# Patient Record
Sex: FEMALE | Race: BLACK OR AFRICAN AMERICAN | NOT HISPANIC OR LATINO | Employment: FULL TIME | URBAN - METROPOLITAN AREA
[De-identification: names, ages, dates, MRNs, and addresses within clinical notes are randomized per-mention and may not be internally consistent; named-entity substitution may affect disease eponyms.]

---

## 2017-01-10 PROBLEM — N83.209 OTHER AND UNSPECIFIED OVARIAN CYST: Status: ACTIVE | Noted: 2017-01-10

## 2017-01-31 ENCOUNTER — HOSPITAL ENCOUNTER (EMERGENCY)
Facility: HOSPITAL | Age: 22
Discharge: HOME | End: 2017-01-31
Attending: EMERGENCY MEDICINE
Payer: MEDICAID

## 2017-01-31 VITALS
OXYGEN SATURATION: 100 % | HEIGHT: 65 IN | BODY MASS INDEX: 38.99 KG/M2 | SYSTOLIC BLOOD PRESSURE: 134 MMHG | HEART RATE: 77 BPM | WEIGHT: 234 LBS | DIASTOLIC BLOOD PRESSURE: 79 MMHG | TEMPERATURE: 99 F | RESPIRATION RATE: 18 BRPM
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
## 2017-01-31 DIAGNOSIS — S63.501A WRIST SPRAIN, RIGHT, INITIAL ENCOUNTER: ICD-10-CM

## 2017-01-31 PROCEDURE — 99284 EMERGENCY DEPT VISIT MOD MDM: CPT | Mod: 25

## 2017-01-31 PROCEDURE — 29125 APPL SHORT ARM SPLINT STATIC: CPT | Mod: RT

## 2017-01-31 PROCEDURE — 25000003 PHARM REV CODE 250: Performed by: NURSE PRACTITIONER

## 2017-01-31 RX ORDER — ACETAMINOPHEN 500 MG
1000 TABLET ORAL
Status: COMPLETED | OUTPATIENT
Start: 2017-01-31 | End: 2017-01-31

## 2017-01-31 RX ADMIN — ACETAMINOPHEN 1000 MG: 500 TABLET ORAL at 03:01

## 2017-01-31 NOTE — ED PROVIDER NOTES
Encounter Date: 2017    SCRIBE #1 NOTE: I, Kristy Kwan, am scribing for, and in the presence of,  Beth Novoa NP. I have scribed the following portions of the note - Other sections scribed: ROS and HPI.       History     Chief Complaint   Patient presents with    Fall     arrived via [facility], called to home for c/o slip and fall while mopping, c/o lower back pain, R hand pain, neg LOC, pregnant, EDC 17, denies vag bleeding     Review of patient's allergies indicates:  No Known Allergies  HPI Comments: CC: Fall    HPI: This 21 y.o. female who is G4:P3:M0:A0 and 10 weeks pregnant with a past medical history of Depression; History of fetal anomaly in prior pregnancy; and Hypertension, presents to the ED via EMS c/o a mechanical slip and fall while mopping just PTA. She states she fell backward and extended her R hand. She is c/o R hand pain and R lower back pain. She denies head injury, LOC, dizziness, numbness/weakness, N/V or any other symptoms. She wants to make sure her baby is fine. She denies abdominal pain, vaginal discharge, and vaginal bleeding. She was seen by Dr. Perez (OB/GYN) this morning prior to her fall and had an US done, which was normal. She is unsure about her LNMP. No prior tx.    The history is provided by the patient. No  was used.     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  Past Surgical History   Procedure Laterality Date    Cholecystectomy       section       Family History   Problem Relation Age of Onset    Breast cancer Neg Hx     Colon cancer Neg Hx     Ovarian cancer Neg Hx      Social History   Substance Use Topics    Smoking  status: Never Smoker    Smokeless tobacco: None    Alcohol use No     Review of Systems   Constitutional: Negative for fever.   Gastrointestinal: Negative for abdominal pain, constipation, diarrhea, nausea and vomiting.   Genitourinary: Negative for vaginal bleeding and vaginal discharge.   Musculoskeletal: Positive for back pain (R, lower) and myalgias (R hand).   Skin: Negative for rash.   Neurological: Negative for dizziness, syncope, weakness, light-headedness, numbness and headaches.       Physical Exam   Initial Vitals   BP Pulse Resp Temp SpO2   01/31/17 1334 01/31/17 1334 01/31/17 1334 01/31/17 1334 01/31/17 1334   153/72 92 20 98.3 °F (36.8 °C) 98 %     Physical Exam    Nursing note and vitals reviewed.  Constitutional: Vital signs are normal. She appears well-developed and well-nourished. She is not diaphoretic. She is active and cooperative. She does not appear ill. No distress.   Eyes: EOM are normal. Pupils are equal, round, and reactive to light.   Abdominal: Soft. Normal appearance. There is no tenderness. There is no rigidity, no rebound, no guarding and no CVA tenderness.   Musculoskeletal:        Right hand: She exhibits tenderness. She exhibits normal capillary refill, no deformity and no swelling. Lacerations: over the 4th and 5th digits, ulnar hand, and ulnar wrist. Normal sensation noted. Normal strength noted.        Hands:  Neurological: She is alert and oriented to person, place, and time.         ED Course   US - ED Performed - OB Limited 1 or More Gestations  Date/Time: 1/31/2017 3:15 PM  Performed by: EMILY GOLDSMITH  Authorized by: SHA MCQUEEN   Comments: Single IUP with fetal heart tones appreciated.        Labs Reviewed - No data to display             Additional MDM:   Comments: This is an urgent evaluation of a 21-year-old female that presents the emergency room after fall today.  Patient is complaining of right hand pain, and on exam she has mild to moderate tenderness over the fourth  and fifth digits, dorsal hand, and ulnar wrist.  There is no gross deformity.  Her compartments are soft.  Sensory motor function and cap refill are intact.  X-rays of the right hand reveal no evidence of acute fracture or dislocation.  Most likely a sprain or strain.  Velcro wrist splint was placed for supportive care.  To follow-up with orthopedics referral in 1 week if no significant improvement.  Cool compresses, tylenol for pain PRN, and rest was encouraged.    The patient is approximately 10 weeks pregnant and was also concerned for her pregnancy related to the fall.  She was seen by her OB/GYN, Dr. Perez, this morning and had a normal ultrasound demonstrating an IUP.  She reports that she did not fall directly on her abdomen, but is still concerned.  She has no abdominal pain or tenderness.  A bedside ultrasound was performed for reassurance, which revealed a single IUP with strong fetal heart tones.  To follow-up with OB/GYN for any further concerns.    Case discussed with attending, , and she is in agreement with plan. Stable for discharge and outpatient follow.  .          Scribe Attestation:   Scribe #1: I performed the above scribed service and the documentation accurately describes the services I performed. I attest to the accuracy of the note.    Attending Attestation:           Physician Attestation for Scribe:  Physician Attestation Statement for Scribe #1: I, Beth Novoa NP, reviewed documentation, as scribed by Kristy Kwan in my presence, and it is both accurate and complete.                 ED Course     Clinical Impression:   The primary encounter diagnosis was Strain of hand and finger, right, initial encounter. Diagnoses of Wrist sprain, right, initial encounter and Fall, initial encounter were also pertinent to this visit.    Disposition:   Disposition: Discharged  Condition: Stable       Beth Novoa NP  02/01/17 0229

## 2017-01-31 NOTE — ED TRIAGE NOTES
About 12 noon fell on a wet floor while mopping today co low abd pain low back pain and right hand pain - denies head or neck or upper or mid back pains

## 2017-01-31 NOTE — ED AVS SNAPSHOT
[facility]  [address] [place] [address]               Lindy Singleton   2017  2:48 PM   ED    Description:  Female : 1995   Department:  [facility]           Your Care was Coordinated By:     Provider Role From To    Vanna Farris MD Attending Provider [id number] --    Beth Novoa NP Nurse Practitioner 17 [phone] --      Reason for Visit     Fall           Diagnoses this Visit        Comments    Strain of hand and finger, right, initial encounter    -  Primary     Wrist sprain, right, initial encounter         Fall, initial encounter           ED Disposition     ED Disposition Condition Comment    Discharge             To Do List           Follow-up Information     Follow up with [facility].    Specialty:  Emergency Medicine    Why:  If symptoms worsen    Contact information:    [address] [place] [id number] [phone]        Follow up with Melissa Barry III, MD.    Specialty:  Orthopedic Surgery    Why:  ORTHOPEDICS/HAND - CALL FOR APPOINTMENT IF HAND/WRIST PAIN DOES NOT RESOLVE    Contact information:    [address]  [facility]  [place] [address]  [phone]        [facility]     [facility] Care [facility] -  Assistance  Registered nurses in the [facility] provide clinical advisement, health education, appointment booking, and other advisory services.  Call for this free service at [phone].             Medications           Message regarding Medications     Verify the changes and/or additions to your medication regime listed below are the same as discussed with your clinician today.  If any of these changes or additions are incorrect, please notify your healthcare provider.        These medications were administered today        Dose Freq    acetaminophen tablet 1,000 mg 1,000 mg ED 1 Time    Sig: Take 2 tablets (1,000 mg total) by mouth ED 1 Time.  "   Class: Normal    Route: Oral      STOP taking these medications     [id number]-dha-epa-fish oil (PRENATAL GUMMY) 400 mcg-35 mg -25 mg-5 mg Chew Take by mouth.    terconazole (TERAZOL 3) 0.8 % vaginal cream Place 1 applicator vaginally every evening.    promethazine (PHENERGAN) 25 MG tablet Take 1 tablet (25 mg total) by mouth every 6 (six) hours as needed for Nausea.           Verify that the below list of medications is an accurate representation of the medications you are currently taking.  If none reported, the list may be blank. If incorrect, please contact your healthcare provider. Carry this list with you in case of emergency.           Current Medications            Clinical Reference Information           Your Vitals Were     BP Pulse Temp Resp Height Weight    153/72 (BP Location: Left arm, Patient Position: Sitting) 92 98.3 °F (36.8 °C) (Oral) 20 5' 5" (1.651 m) 106.1 kg (234 lb)    Last Period SpO2 BMI          11/20/2016 (Exact Date) 98% 38.94 kg/m2        Allergies as of 1/31/2017     No Known Allergies      Immunizations Administered on Date of Encounter - 1/31/2017     None      ED Micro, Lab, POCT     None      ED Imaging Orders     Start Ordered       Status Ordering Provider    01/31/17 1503 01/31/17 1503  X-Ray Hand 3 view Right  1 time imaging     Comments:  Please use shield if possible    Final result         Discharge Instructions       You can wear the Velcro wrist splint to help immobilize the joints that are injured and allow them to rest and heal. You should use cool compresses or ice over the affected areas to reduce swelling.  You can take Tylenol for pain, as needed.  Please do not exceed over 4000 mg of acetaminophen per day.    Follow up with orthopedics for further evaluation of your hand/wrist pain, if it does not resolve in 4-6 weeks.    Return to the ER for any new or worsening symptoms or concerns.    Discharge References/Attachments     WRIST SPRAIN (ENGLISH)    FINGER " ADRIANA (ENGLISH)      Your Scheduled Appointments     Feb 21, 2017  9:00 AM CST   Repeat OB Visit-[facility] with Kristan Perez MD   [facility] ([facility])    [address] [place] [address]  [place] [address]   [phone]              [facility] Sign-Up     Activating your [facility] account is as easy as 1-2-3!     1) Visit [email or web address], select Sign Up Now, enter this activation code and your date of birth, then select Next.  [email or web address]  Expires: 3/17/2017  3:57 PM      2) Create a username and password to use when you visit [facility] in the future and select a security question in case you lose your password and select Next.    3) Enter your e-mail address and click Sign Up!    Additional Information  If you have questions, please e-mail [email or web address] or call [phone] to talk to our [facility] staff. Remember, [facility] is NOT to be used for urgent needs. For medical emergencies, dial 911.          [facility] complies with applicable Federal civil rights laws and does not discriminate on the basis of race, color, national origin, age, disability, or sex.        Language Assistance Services     ATTENTION: Language assistance services are available, free of charge. Please call [phone].      ATENCIÓN: Si habla español, tiene a lion disposición servicios gratuitos de asistencia lingüística. Llame al [phone].     CHÚ Ý: N?u b?n nói Ti?ng Vi?t, có các d?ch v? h? tr? ngôn ng? mi?n phí dành cho b?n. G?i s? [phone].

## 2017-01-31 NOTE — DISCHARGE INSTRUCTIONS
You can wear the Velcro wrist splint to help immobilize the joints that are injured and allow them to rest and heal. You should use cool compresses or ice over the affected areas to reduce swelling.  You can take Tylenol for pain, as needed.  Please do not exceed over 4000 mg of acetaminophen per day.    Follow up with orthopedics for further evaluation of your hand/wrist pain, if it does not resolve in 4-6 weeks.    Return to the ER for any new or worsening symptoms or concerns.

## 2017-05-17 PROBLEM — O44.40 LOW-LYING PLACENTA: Status: ACTIVE | Noted: 2017-05-17

## 2017-07-24 ENCOUNTER — OFFICE VISIT (OUTPATIENT)
Dept: MATERNAL FETAL MEDICINE | Facility: CLINIC | Age: 22
End: 2017-07-24
Payer: MEDICAID

## 2017-07-24 VITALS
SYSTOLIC BLOOD PRESSURE: 136 MMHG | HEIGHT: 65 IN | DIASTOLIC BLOOD PRESSURE: 84 MMHG | BODY MASS INDEX: 43.75 KG/M2 | WEIGHT: 262.56 LBS

## 2017-07-24 DIAGNOSIS — O36.5930 INTRAUTERINE GROWTH RESTRICTION AFFECTING ANTEPARTUM CARE OF MOTHER IN THIRD TRIMESTER, NOT APPLICABLE OR UNSPECIFIED FETUS: Primary | ICD-10-CM

## 2017-07-24 PROCEDURE — 76811 OB US DETAILED SNGL FETUS: CPT | Mod: 26,S$PBB,, | Performed by: OBSTETRICS & GYNECOLOGY

## 2017-07-24 PROCEDURE — 76811 OB US DETAILED SNGL FETUS: CPT | Mod: PBBFAC | Performed by: OBSTETRICS & GYNECOLOGY

## 2017-07-24 PROCEDURE — 99213 OFFICE O/P EST LOW 20 MIN: CPT | Mod: PBBFAC | Performed by: OBSTETRICS & GYNECOLOGY

## 2017-07-24 PROCEDURE — 99202 OFFICE O/P NEW SF 15 MIN: CPT | Mod: S$PBB,TH,25, | Performed by: OBSTETRICS & GYNECOLOGY

## 2017-07-24 PROCEDURE — 99999 PR PBB SHADOW E&M-EST. PATIENT-LVL III: CPT | Mod: PBBFAC,,, | Performed by: OBSTETRICS & GYNECOLOGY

## 2017-07-24 NOTE — LETTER
July 25, 2017      Luis Urrutia MD  [address]  [address]  [place] [address]           [facility]  [address] [facility] [address]  Phone: [phone]          Patient: Lindy Singleton   MR Number: [id number]   YOB: 1995   Date of Visit: 7/24/2017       Dear Dr. Luis Urrutia:    Thank you for referring Lindy Singleton to me for evaluation. Attached you will find relevant portions of my assessment and plan of care.    If you have questions, please do not hesitate to call me. I look forward to following Lindy Singleton along with you.    Sincerely,    Srinivasan Chow MD    Enclosure  CC:  No Recipients    If you would like to receive this communication electronically, please contact [email or web address] or [phone] to request more information on [employer] Link access.    For providers and/or their staff who would like to refer a patient to [facility], please contact us through our one-stop-shop provider referral line, [facility], at [phone].    If you feel you have received this communication in error or would no longer like to receive these types of communications, please e-mail [email or web address]

## 2017-07-25 NOTE — PROGRESS NOTES
[facility] Consult    Requesting Physician: Dr. Urrutia    Reason for Consultation: IUGR    Lindy is a 21-year-old para 3003 currently at 35 weeks and 1 day gestation based on early ultrasound evaluation who is seen in consultation because of a recent ultrasound which demonstrated IUGR.  Overall this pregnancy has been uncomplicated.  She has a history of chronic hypertension which was diagnosed in  and she was on labetalol until 2016.  She has not been on any medications during this pregnancy.  She has no history of end organ damage from her hypertension.  She also has a history of depression but she is not currently on medications.  She has a BMI of 43 and an ultrasound was recently performed to assess fetal growth.  That ultrasound demonstrated IUGR and therefore a consultation was requested.  I do not have the records of that ultrasound so am unaware with estimated fetal weight was.  She reports good fetal movement.  She has not had any bleeding.  She has not had any signs of labor. Her blood pressure in our office initially was 140/90 but on repeat it was 136/84.  She denies any significant signs or symptoms of preeclampsia.  She reports that she has had occasional headaches but they go away spontaneously without any treatment.  Her urine dip shows trace protein and trace glucose.  She had her 1 hour glucose challenge test which was abnormal at 161 but she has subsequently had a 3 hour GTT which was normal.    Her past medical history is remarkable for depression and hypertension.  Her past surgical history is notable for prior  delivery in  as well as a cholecystectomy.  Her social history is negative ×3.  Her family history is notable for the fact that her child born in  had a large omphalocele.  Her first child weighed 7 lbs. 10 oz. and was male at 40 weeks.  Her second child weighed 5 pounds and was born at 37 weeks.  Her third child who had the omphalocele was born at 37 weeks and  weighed 6 lbs. 12 oz.  She had aneuploidy screening in this pregnancy which returned low risk and her maternal serum AFP was also normal.    A targeted ultrasound evaluation was performed and the findings are detailed on the viewpoint report.  Overall views were somewhat limited by the gestational age and the fetal position but we saw no evidence of any gross structural abnormalities.  When evaluating the intracranial anatomy we saw a cavum vergae variant.  The estimated fetal weight was 2436 g which is at the 21st percentile.  The amniotic fluid volume appeared normal.    I overall spent approximately 20 minutes in face-to-face time with her greater than 50% of which were spent in counseling time regarding today's findings and in discussion of further care coordination.  I explained that at this point we do not see any evidence of significant fetal growth restriction, but given her history of chronic hypertension as well as the potential for gestational hypertension I would recommend delivering her at around 39 weeks and she says a repeat  is scheduled for .  In addition I also would recommend continuing with  testing with either weekly biophysical profiles or twice weekly nonstress test and once a week amniotic fluid assessment.  We discussed how ultrasound weight estimate can vary, but given the fetal weight estimate today and the size of her prior infants, I do not think that this likely represents pathologic growth restriction.  I do not think that she needs further Doppler studies at this point.  If she should develop any signs of worsening gestational hypertension or preeclampsia, I would then recommend delivery prior to 39 weeks.    Thank you for allowing me to participate in her care.  If I can can answer any further questions or be of any assistance, please do not hesitate to call.    Srinivasan Chow Jr., MD  Maternal Fetal Medicine

## 2017-07-26 ENCOUNTER — HOSPITAL ENCOUNTER (EMERGENCY)
Facility: OTHER | Age: 22
Discharge: HOME | End: 2017-07-26
Attending: EMERGENCY MEDICINE
Payer: MEDICAID

## 2017-07-26 VITALS
BODY MASS INDEX: 42.59 KG/M2 | HEIGHT: 66 IN | OXYGEN SATURATION: 98 % | DIASTOLIC BLOOD PRESSURE: 75 MMHG | WEIGHT: 265 LBS | RESPIRATION RATE: 18 BRPM | HEART RATE: 90 BPM | TEMPERATURE: 99 F | SYSTOLIC BLOOD PRESSURE: 135 MMHG

## 2017-07-26 DIAGNOSIS — S90.212A CONTUSION OF LEFT GREAT TOE WITH DAMAGE TO NAIL, INITIAL ENCOUNTER: Primary | ICD-10-CM

## 2017-07-26 DIAGNOSIS — T14.90XA INJURY: ICD-10-CM

## 2017-07-26 PROCEDURE — 99283 EMERGENCY DEPT VISIT LOW MDM: CPT

## 2017-07-26 RX ORDER — IBUPROFEN 800 MG/1
800 TABLET ORAL EVERY 6 HOURS PRN
Qty: 30 TABLET | Refills: 0 | Status: SHIPPED | OUTPATIENT
Start: 2017-07-26 | End: 2017-08-01
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
## 2017-07-31 PROBLEM — O99.820 GBS (GROUP B STREPTOCOCCUS CARRIER), +RV CULTURE, CURRENTLY PREGNANT: Status: ACTIVE | Noted: 2017-07-31

## 2017-08-08 PROBLEM — O44.40 LOW-LYING PLACENTA: Status: RESOLVED | Noted: 2017-05-17 | Resolved: 2017-08-08

## 2017-08-17 PROBLEM — O99.019 ANEMIA DURING PREGNANCY: Status: ACTIVE | Noted: 2017-08-17

## 2017-08-23 ENCOUNTER — SURGERY (OUTPATIENT)
Age: 22
End: 2017-08-23

## 2017-08-23 ENCOUNTER — HOSPITAL ENCOUNTER (INPATIENT)
Facility: HOSPITAL | Age: 22
LOS: 2 days | Discharge: HOME | End: 2017-08-25
Attending: OBSTETRICS & GYNECOLOGY | Admitting: OBSTETRICS & GYNECOLOGY
Payer: MEDICAID

## 2017-08-23 ENCOUNTER — ANESTHESIA (OUTPATIENT)
Dept: OBSTETRICS AND GYNECOLOGY | Facility: HOSPITAL | Age: 22
End: 2017-08-23
Payer: MEDICAID

## 2017-08-23 ENCOUNTER — ANESTHESIA EVENT (OUTPATIENT)
Dept: OBSTETRICS AND GYNECOLOGY | Facility: HOSPITAL | Age: 22
End: 2017-08-23
Payer: MEDICAID

## 2017-08-23 DIAGNOSIS — Z34.03 ENCOUNTER FOR SUPERVISION OF NORMAL FIRST PREGNANCY IN THIRD TRIMESTER: ICD-10-CM

## 2017-08-23 LAB
ABO + RH BLD: NORMAL
BASOPHILS # BLD AUTO: 0.02 K/UL
BASOPHILS NFR BLD: 0.2 %
BLD GP AB SCN CELLS X3 SERPL QL: NORMAL
DIFFERENTIAL METHOD: ABNORMAL
EOSINOPHIL # BLD AUTO: 0.2 K/UL
EOSINOPHIL NFR BLD: 2.2 %
ERYTHROCYTE [DISTWIDTH] IN BLOOD BY AUTOMATED COUNT: 13.8 %
HCT VFR BLD AUTO: 33.4 %
HGB BLD-MCNC: 11.4 G/DL
LYMPHOCYTES # BLD AUTO: 2.9 K/UL
LYMPHOCYTES NFR BLD: 29.9 %
MCH RBC QN AUTO: 27.9 PG
MCHC RBC AUTO-ENTMCNC: 34.1 G/DL
MCV RBC AUTO: 82 FL
MONOCYTES # BLD AUTO: 0.7 K/UL
MONOCYTES NFR BLD: 7.3 %
NEUTROPHILS # BLD AUTO: 5.9 K/UL
NEUTROPHILS NFR BLD: 60.4 %
PLATELET # BLD AUTO: 236 K/UL
PMV BLD AUTO: 11 FL
RBC # BLD AUTO: 4.09 M/UL
WBC # BLD AUTO: 9.71 K/UL

## 2017-08-23 PROCEDURE — 37000008 HC ANESTHESIA 1ST 15 MINUTES: Performed by: OBSTETRICS & GYNECOLOGY

## 2017-08-23 PROCEDURE — 36415 COLL VENOUS BLD VENIPUNCTURE: CPT

## 2017-08-23 PROCEDURE — 51702 INSERT TEMP BLADDER CATH: CPT

## 2017-08-23 PROCEDURE — 63600175 PHARM REV CODE 636 W HCPCS: Performed by: NURSE ANESTHETIST, CERTIFIED REGISTERED

## 2017-08-23 PROCEDURE — 63600175 PHARM REV CODE 636 W HCPCS: Performed by: OBSTETRICS & GYNECOLOGY

## 2017-08-23 PROCEDURE — 27200688 HC TRAY, SPINAL-HYPER/ ISOBARIC: Performed by: ANESTHESIOLOGY

## 2017-08-23 PROCEDURE — S0028 INJECTION, FAMOTIDINE, 20 MG: HCPCS | Performed by: ANESTHESIOLOGY

## 2017-08-23 PROCEDURE — 63600175 PHARM REV CODE 636 W HCPCS: Performed by: ANESTHESIOLOGY

## 2017-08-23 PROCEDURE — 59514 CESAREAN DELIVERY ONLY: CPT | Mod: ANES,,, | Performed by: ANESTHESIOLOGY

## 2017-08-23 PROCEDURE — 25000003 PHARM REV CODE 250: Performed by: NURSE ANESTHETIST, CERTIFIED REGISTERED

## 2017-08-23 PROCEDURE — 62322 NJX INTERLAMINAR LMBR/SAC: CPT | Performed by: ANESTHESIOLOGY

## 2017-08-23 PROCEDURE — 25000003 PHARM REV CODE 250: Performed by: ANESTHESIOLOGY

## 2017-08-23 PROCEDURE — 36000685 HC CESAREAN SECTION LEVEL I

## 2017-08-23 PROCEDURE — 59514 CESAREAN DELIVERY ONLY: CPT | Mod: CRNA,,, | Performed by: NURSE ANESTHETIST, CERTIFIED REGISTERED

## 2017-08-23 PROCEDURE — 85025 COMPLETE CBC W/AUTO DIFF WBC: CPT

## 2017-08-23 PROCEDURE — 11000001 HC ACUTE MED/SURG PRIVATE ROOM

## 2017-08-23 PROCEDURE — 25000003 PHARM REV CODE 250: Performed by: OBSTETRICS & GYNECOLOGY

## 2017-08-23 PROCEDURE — 86900 BLOOD TYPING SEROLOGIC ABO: CPT

## 2017-08-23 PROCEDURE — 37000009 HC ANESTHESIA EA ADD 15 MINS: Performed by: OBSTETRICS & GYNECOLOGY

## 2017-08-23 PROCEDURE — 86850 RBC ANTIBODY SCREEN: CPT

## 2017-08-23 PROCEDURE — 27200918 HC ALEXIS O RING

## 2017-08-23 RX ORDER — SODIUM CITRATE AND CITRIC ACID MONOHYDRATE 334; 500 MG/5ML; MG/5ML
30 SOLUTION ORAL ONCE
Status: DISCONTINUED | OUTPATIENT
Start: 2017-08-23 | End: 2017-08-23 | Stop reason: SDUPTHER

## 2017-08-23 RX ORDER — FENTANYL CITRATE 50 UG/ML
INJECTION, SOLUTION INTRAMUSCULAR; INTRAVENOUS
Status: DISCONTINUED | OUTPATIENT
Start: 2017-08-23 | End: 2017-08-23

## 2017-08-23 RX ORDER — BISACODYL 10 MG
10 SUPPOSITORY, RECTAL RECTAL ONCE AS NEEDED
Status: ACTIVE | OUTPATIENT
Start: 2017-08-23 | End: 2017-08-23

## 2017-08-23 RX ORDER — HYDROCORTISONE 25 MG/G
CREAM TOPICAL 3 TIMES DAILY PRN
Status: DISCONTINUED | OUTPATIENT
Start: 2017-08-23 | End: 2017-08-25 | Stop reason: HOSPADM

## 2017-08-23 RX ORDER — ACETAMINOPHEN 10 MG/ML
1000 INJECTION, SOLUTION INTRAVENOUS ONCE
Status: COMPLETED | OUTPATIENT
Start: 2017-08-23 | End: 2017-08-23

## 2017-08-23 RX ORDER — NALOXONE HCL 0.4 MG/ML
0.02 VIAL (ML) INJECTION
Status: DISCONTINUED | OUTPATIENT
Start: 2017-08-23 | End: 2017-08-25 | Stop reason: HOSPADM

## 2017-08-23 RX ORDER — PHENYLEPHRINE HYDROCHLORIDE 10 MG/ML
INJECTION INTRAVENOUS
Status: DISCONTINUED | OUTPATIENT
Start: 2017-08-23 | End: 2017-08-23

## 2017-08-23 RX ORDER — ADHESIVE BANDAGE
30 BANDAGE TOPICAL 2 TIMES DAILY PRN
Status: DISCONTINUED | OUTPATIENT
Start: 2017-08-24 | End: 2017-08-25 | Stop reason: HOSPADM

## 2017-08-23 RX ORDER — KETOROLAC TROMETHAMINE 30 MG/ML
30 INJECTION, SOLUTION INTRAMUSCULAR; INTRAVENOUS EVERY 6 HOURS
Status: COMPLETED | OUTPATIENT
Start: 2017-08-23 | End: 2017-08-24

## 2017-08-23 RX ORDER — OXYCODONE AND ACETAMINOPHEN 5; 325 MG/1; MG/1
1 TABLET ORAL EVERY 4 HOURS PRN
Status: DISCONTINUED | OUTPATIENT
Start: 2017-08-23 | End: 2017-08-25 | Stop reason: HOSPADM

## 2017-08-23 RX ORDER — LABETALOL 100 MG/1
100 TABLET, FILM COATED ORAL EVERY 12 HOURS
Status: DISCONTINUED | OUTPATIENT
Start: 2017-08-23 | End: 2017-08-24

## 2017-08-23 RX ORDER — OXYTOCIN 10 [USP'U]/ML
INJECTION, SOLUTION INTRAMUSCULAR; INTRAVENOUS
Status: DISCONTINUED | OUTPATIENT
Start: 2017-08-23 | End: 2017-08-23

## 2017-08-23 RX ORDER — ONDANSETRON HYDROCHLORIDE 2 MG/ML
INJECTION, SOLUTION INTRAMUSCULAR; INTRAVENOUS
Status: DISCONTINUED | OUTPATIENT
Start: 2017-08-23 | End: 2017-08-23

## 2017-08-23 RX ORDER — HYDROMORPHONE HCL IN 0.9% NACL 6 MG/30 ML
PATIENT CONTROLLED ANALGESIA SYRINGE INTRAVENOUS CONTINUOUS
Status: DISCONTINUED | OUTPATIENT
Start: 2017-08-23 | End: 2017-08-25 | Stop reason: HOSPADM

## 2017-08-23 RX ORDER — ONDANSETRON 8 MG/1
8 TABLET, ORALLY DISINTEGRATING ORAL EVERY 8 HOURS PRN
Status: DISCONTINUED | OUTPATIENT
Start: 2017-08-23 | End: 2017-08-23

## 2017-08-23 RX ORDER — OXYTOCIN/RINGER'S LACTATE 20/1000 ML
41.65 PLASTIC BAG, INJECTION (ML) INTRAVENOUS CONTINUOUS
Status: ACTIVE | OUTPATIENT
Start: 2017-08-23 | End: 2017-08-23

## 2017-08-23 RX ORDER — OXYCODONE AND ACETAMINOPHEN 10; 325 MG/1; MG/1
1 TABLET ORAL EVERY 4 HOURS PRN
Status: DISCONTINUED | OUTPATIENT
Start: 2017-08-23 | End: 2017-08-25 | Stop reason: HOSPADM

## 2017-08-23 RX ORDER — DOCUSATE SODIUM 100 MG/1
200 CAPSULE, LIQUID FILLED ORAL 2 TIMES DAILY
Status: DISCONTINUED | OUTPATIENT
Start: 2017-08-23 | End: 2017-08-25 | Stop reason: HOSPADM

## 2017-08-23 RX ORDER — IBUPROFEN 600 MG/1
600 TABLET ORAL EVERY 6 HOURS PRN
Status: DISCONTINUED | OUTPATIENT
Start: 2017-08-23 | End: 2017-08-25 | Stop reason: HOSPADM

## 2017-08-23 RX ORDER — METOCLOPRAMIDE HYDROCHLORIDE 5 MG/ML
10 INJECTION INTRAMUSCULAR; INTRAVENOUS ONCE
Status: DISCONTINUED | OUTPATIENT
Start: 2017-08-23 | End: 2017-08-23 | Stop reason: SDUPTHER

## 2017-08-23 RX ORDER — FAMOTIDINE 10 MG/ML
20 INJECTION INTRAVENOUS ONCE
Status: DISCONTINUED | OUTPATIENT
Start: 2017-08-23 | End: 2017-08-23 | Stop reason: SDUPTHER

## 2017-08-23 RX ORDER — SODIUM CITRATE AND CITRIC ACID MONOHYDRATE 334; 500 MG/5ML; MG/5ML
30 SOLUTION ORAL ONCE
Status: COMPLETED | OUTPATIENT
Start: 2017-08-23 | End: 2017-08-23

## 2017-08-23 RX ORDER — ONDANSETRON 8 MG/1
8 TABLET, ORALLY DISINTEGRATING ORAL EVERY 8 HOURS PRN
Status: DISCONTINUED | OUTPATIENT
Start: 2017-08-23 | End: 2017-08-25 | Stop reason: HOSPADM

## 2017-08-23 RX ORDER — SODIUM CHLORIDE, SODIUM LACTATE, POTASSIUM CHLORIDE, CALCIUM CHLORIDE 600; 310; 30; 20 MG/100ML; MG/100ML; MG/100ML; MG/100ML
INJECTION, SOLUTION INTRAVENOUS CONTINUOUS PRN
Status: DISCONTINUED | OUTPATIENT
Start: 2017-08-23 | End: 2017-08-23

## 2017-08-23 RX ORDER — MISOPROSTOL 200 UG/1
600 TABLET ORAL
Status: DISCONTINUED | OUTPATIENT
Start: 2017-08-23 | End: 2017-08-25 | Stop reason: HOSPADM

## 2017-08-23 RX ORDER — SODIUM CHLORIDE, SODIUM LACTATE, POTASSIUM CHLORIDE, CALCIUM CHLORIDE 600; 310; 30; 20 MG/100ML; MG/100ML; MG/100ML; MG/100ML
INJECTION, SOLUTION INTRAVENOUS CONTINUOUS
Status: ACTIVE | OUTPATIENT
Start: 2017-08-23 | End: 2017-08-24

## 2017-08-23 RX ORDER — BUPIVACAINE HYDROCHLORIDE 7.5 MG/ML
INJECTION, SOLUTION INTRASPINAL
Status: DISCONTINUED | OUTPATIENT
Start: 2017-08-23 | End: 2017-08-23

## 2017-08-23 RX ORDER — AMOXICILLIN 250 MG
1 CAPSULE ORAL NIGHTLY PRN
Status: DISCONTINUED | OUTPATIENT
Start: 2017-08-23 | End: 2017-08-25 | Stop reason: HOSPADM

## 2017-08-23 RX ORDER — METOCLOPRAMIDE HYDROCHLORIDE 5 MG/ML
10 INJECTION INTRAMUSCULAR; INTRAVENOUS ONCE
Status: COMPLETED | OUTPATIENT
Start: 2017-08-23 | End: 2017-08-23

## 2017-08-23 RX ORDER — CEFAZOLIN SODIUM 2 G/50ML
2 SOLUTION INTRAVENOUS ONCE
Status: COMPLETED | OUTPATIENT
Start: 2017-08-23 | End: 2017-08-23

## 2017-08-23 RX ORDER — SIMETHICONE 80 MG
1 TABLET,CHEWABLE ORAL EVERY 6 HOURS PRN
Status: DISCONTINUED | OUTPATIENT
Start: 2017-08-23 | End: 2017-08-25 | Stop reason: HOSPADM

## 2017-08-23 RX ORDER — DIPHENHYDRAMINE HCL 25 MG
25 CAPSULE ORAL EVERY 4 HOURS PRN
Status: DISCONTINUED | OUTPATIENT
Start: 2017-08-23 | End: 2017-08-25 | Stop reason: HOSPADM

## 2017-08-23 RX ORDER — FAMOTIDINE 10 MG/ML
20 INJECTION INTRAVENOUS ONCE
Status: COMPLETED | OUTPATIENT
Start: 2017-08-23 | End: 2017-08-23

## 2017-08-23 RX ADMIN — FENTANYL CITRATE 15 MCG: 50 INJECTION INTRAMUSCULAR; INTRAVENOUS at 10:08

## 2017-08-23 RX ADMIN — PHENYLEPHRINE HYDROCHLORIDE 100 MCG: 10 INJECTION INTRAVENOUS at 10:08

## 2017-08-23 RX ADMIN — BUPIVACAINE HYDROCHLORIDE IN DEXTROSE 1.6 ML: 7.5 INJECTION, SOLUTION SUBARACHNOID at 10:08

## 2017-08-23 RX ADMIN — SODIUM CHLORIDE, SODIUM LACTATE, POTASSIUM CHLORIDE, AND CALCIUM CHLORIDE: .6; .31; .03; .02 INJECTION, SOLUTION INTRAVENOUS at 10:08

## 2017-08-23 RX ADMIN — SODIUM CITRATE AND CITRIC ACID MONOHYDRATE 30 ML: 500; 334 SOLUTION ORAL at 09:08

## 2017-08-23 RX ADMIN — METOCLOPRAMIDE 10 MG: 5 INJECTION, SOLUTION INTRAMUSCULAR; INTRAVENOUS at 09:08

## 2017-08-23 RX ADMIN — CEFAZOLIN SODIUM 2 G: 2 SOLUTION INTRAVENOUS at 09:08

## 2017-08-23 RX ADMIN — FENTANYL CITRATE 35 MCG: 50 INJECTION INTRAMUSCULAR; INTRAVENOUS at 10:08

## 2017-08-23 RX ADMIN — FENTANYL CITRATE 50 MCG: 50 INJECTION INTRAMUSCULAR; INTRAVENOUS at 10:08

## 2017-08-23 RX ADMIN — FAMOTIDINE 20 MG: 10 INJECTION, SOLUTION INTRAVENOUS at 09:08

## 2017-08-23 RX ADMIN — ACETAMINOPHEN 1000 MG: 10 INJECTION, SOLUTION INTRAVENOUS at 01:08

## 2017-08-23 RX ADMIN — Medication: at 11:08

## 2017-08-23 RX ADMIN — OXYTOCIN 30 UNITS: 10 INJECTION, SOLUTION INTRAMUSCULAR; INTRAVENOUS at 10:08

## 2017-08-23 RX ADMIN — LABETALOL HYDROCHLORIDE 100 MG: 100 TABLET, FILM COATED ORAL at 09:08

## 2017-08-23 RX ADMIN — ONDANSETRON 8 MG: 8 TABLET, ORALLY DISINTEGRATING ORAL at 07:08

## 2017-08-23 RX ADMIN — OXYTOCIN 20 UNITS: 10 INJECTION, SOLUTION INTRAMUSCULAR; INTRAVENOUS at 10:08

## 2017-08-23 RX ADMIN — Medication: at 03:08

## 2017-08-23 RX ADMIN — SODIUM CHLORIDE, SODIUM LACTATE, POTASSIUM CHLORIDE, AND CALCIUM CHLORIDE: .6; .31; .03; .02 INJECTION, SOLUTION INTRAVENOUS at 07:08

## 2017-08-23 RX ADMIN — SODIUM CHLORIDE, SODIUM LACTATE, POTASSIUM CHLORIDE, AND CALCIUM CHLORIDE: .6; .31; .03; .02 INJECTION, SOLUTION INTRAVENOUS at 09:08

## 2017-08-23 RX ADMIN — LABETALOL HYDROCHLORIDE 100 MG: 100 TABLET, FILM COATED ORAL at 02:08

## 2017-08-23 RX ADMIN — SODIUM CHLORIDE, SODIUM LACTATE, POTASSIUM CHLORIDE, AND CALCIUM CHLORIDE 1000 ML: .6; .31; .03; .02 INJECTION, SOLUTION INTRAVENOUS at 08:08

## 2017-08-23 RX ADMIN — ONDANSETRON 4 MG: 2 INJECTION, SOLUTION INTRAMUSCULAR; INTRAVENOUS at 10:08

## 2017-08-23 RX ADMIN — KETOROLAC TROMETHAMINE 30 MG: 30 INJECTION, SOLUTION INTRAMUSCULAR at 08:08

## 2017-08-23 NOTE — INTERVAL H&P NOTE
The patient has been examined and the H&P has been reviewed:    I concur with the findings and no changes have occurred since H&P was written.    Anesthesia/Surgery risks, benefits and alternative options discussed and understood by patient/family.          Active Hospital Problems    Diagnosis  POA    Encounter for supervision of normal first pregnancy in third trimester [Z34.03]  Not Applicable      Resolved Hospital Problems    Diagnosis Date Resolved POA   No resolved problems to display.

## 2017-08-23 NOTE — OP NOTE
2017      Pre-op: Term pregnancy    History of previous         Post-op:   Term pregnancy    History of previous           Procedure:  Repeat Low Transverse  Section with 2 layer closure    Indications: 21 y.o.  with IUP at 39w3d with previous .     Findings:  female infant, vertex presentation, APGARS 9 at 1 minute, 9 at 5 minutes, weight is pending, normal uterus, tubes and ovaries      EBL: 600 cc    Specimen:  Placenta sent No    Complications:  None    Patient was taken to the operating room after informed consent.  Epidural anesthesia was found to be adequate.  She was prepped and draped in the normal sterile fashion in the dorsal supine position.  Nash catheter had been placed.  A Pfannenstiel skin incision was made  and carried down to the underlying layer of fascia with the Bovie.  The fascia was incised in the midline and extended laterally with the curved Galvan scissors.  The inferior aspect of the fascial incision was grasped with the Ochsner clamps, and the rectus muscle was dissected off using the Bovie Cautery.  The superior aspect of the fascial incision was grasped with the Ochsner clamps, and the rectus muscle was dissected off using the curved Galvan scissors.  The rectus muscles were  in the midline.  The peritoneum was grasped, elevated, and entered sharply with the Metzenbaum scissors.  The incision was extended superiorly and inferiorly with good visualization of the bladder.  The Belia self-retaining retractor was placed within the peritoneal cavity and deployed. The vesicouterine peritoneum was grasped with the pick-ups, elevated, and entered sharply with the Metzenbaum scissors.  The incision was extended laterally, and the bladder flap was created digitally.  The bladder blade was reinserted.  A low transverse incision was made with the scalpel and extended laterally with finger fracture technique.  Membranes were ruptured.  Clear fluid  was present.  The vertex was delivered atraumatically.  The mouth and nose were suctioned with the bulb.  The remaining infant delivered without difficulty.  The cord was cut and clamped.  The infant was handed to the awaiting  nurse practitioner.  The placenta was delivered.  The uterus was cleared of all clots and debris.  The uterus was repaired in a running, locked fashion with #1 chromic.  A second layer using #1 chromic was used to imbricate the incision. The paracolic gutters were cleared of clots and debris.  The uterine incision was irrigated and found to be hemostasis.  The rectus muscles were plicated with chromic suture.  The fascia was closed with 1 looped PDS in a running fashion.  The subcutaneous tissue was re-approximated with 2-0 plain gut.  The skin was closed with Insorb stables.      The patient tolerated the procedure without well.  All counts were correct.  Patient will be taken to the recovery room stable condition.    Augustus Albert MD

## 2017-08-23 NOTE — ANESTHESIA PREPROCEDURE EVALUATION
08/23/2017  Lindy Singleton is a 21 y.o., female.    Anesthesia Evaluation     I have reviewed the Nursing Notes.      Review of Systems  Anesthesia Hx:  No problems with previous Anesthesia   Social:  Non-Smoker    Cardiovascular:   Exercise tolerance: poor Denies Pacemaker. Hypertension (pt had HTN before pregnancy)  Denies Valvular problems/Murmurs.  Denies MI.  Denies CAD.    Denies CABG/stent.  Denies Dysrhythmias.   Denies Angina.             denies PVD    Pulmonary:  Pulmonary Normal    Renal/:  Renal/ Normal     Hepatic/GI:   No Bowel Prep. Denies PUD. Denies Liver Disease. Denies Hepatitis.    Neurological:  Neurology Normal    Endocrine:  Endocrine Normal    Psych:   Psychiatric History          Physical Exam  General:  Morbid Obesity    Airway/Jaw/Neck:  AIRWAY FINDINGS: Normal           Mental Status:  Mental Status Findings: Normal        Anesthesia Plan  Type of Anesthesia, risks & benefits discussed:  Anesthesia Type:  spinal  Patient's Preference:   Intra-op Monitoring Plan: standard ASA monitors  Intra-op Monitoring Plan Comments:   Post Op Pain Control Plan:   Post Op Pain Control Plan Comments:   Induction:    Beta Blocker:  Patient is not currently on a Beta-Blocker (No further documentation required).       Informed Consent: Patient understands risks and agrees with Anesthesia plan.  Questions answered. Anesthesia consent signed with patient.  ASA Score: 2     Day of Surgery Review of History & Physical:    H&P update referred to the provider.         Ready For Surgery From Anesthesia Perspective.

## 2017-08-23 NOTE — LACTATION NOTE
"This note was copied from a baby's chart.     08/23/17 1110   Maternal Infant Assessment   Breast Density Bilateral:;soft   Areola Bilateral:;elastic   Nipple(s) Bilateral:;everted   Infant Assessment   Sucking Reflex present   Rooting Reflex present   Swallow Reflex present   LATCH Score   Latch 2-->grasps breast, tongue down, lips flanged, rhythmic sucking   Audible Swallowing 2-->spontaneous and intermittent (24 hrs old)   Type Of Nipple 2-->everted (after stimulation)   Comfort (Breast/Nipple) 2-->soft/nontender   Hold (Positioning) 2-->no assist from staff, mother able to position/hold infant   Score (less than 7 for 2/more consecutive times, consult Lactation Consultant) 10   Maternal Infant Feeding   Maternal Emotional State independent;relaxed   Infant Positioning cradle   Signs of Milk Transfer infant jaw motion present;audible swallow   Time Spent (min) 0-15 min   Latch Assistance no   Breastfeeding History   Breastfeeding History yes   Duration of Previous Breastfeeding pumped x 6 months for 1 yr old   Infant First Feeding   Breastfeeding Right Side (min) 10 Min   Feeding Infant   Feeding Tolerance/Success alert for feeding   Effective Latch During Feeding yes   Audible Swallow yes   Suck/Swallow Coordination present   Lactation Referrals   Lactation Consult Initial assessment;Knowledge deficit   Lactation Interventions   Maternal Breastfeeding Support encouragement offered;lactation counseling provided     Independently latched well to right breast in cradle hold; audible swallows noted.   Basic breastfeeding instructions given and Mother's Breastfeeding Guide reviewed.  Encouraged to call for assist prn.  States "understand" and verbalized appropriate recall.    "

## 2017-08-23 NOTE — NURSING
Notified Dr. Albert of pt's Bp's. MD gave order for Labetalol 100 mg po bid to be started today, first dose now. Pt may be transferred to pp once bp's are stable.

## 2017-08-23 NOTE — TRANSFER OF CARE
"Anesthesia Transfer of Care Note    Patient: Lindy Singleton    Procedure(s) Performed: Procedure(s) (LRB):  DELIVERY- SECTION (N/A)    Patient location: Labor and Delivery    Anesthesia Type: spinal    Transport from OR: Transported from OR on room air with adequate spontaneous ventilation    Post pain: adequate analgesia    Post assessment: no apparent anesthetic complications    Post vital signs: stable    Level of consciousness: awake, alert and oriented    Nausea/Vomiting: no nausea/vomiting    Complications: none    Transfer of care protocol was followed      Last vitals:   Visit Vitals  /66   Pulse 73   Temp 36.5 °C (97.7 °F) (Oral)   Resp 16   Ht 5' 5" (1.651 m)   Wt 121.6 kg (268 lb)   LMP 2016 (Exact Date)   SpO2 100%   Breastfeeding? No   BMI 44.60 kg/m²     "

## 2017-08-23 NOTE — ANESTHESIA POSTPROCEDURE EVALUATION
"Anesthesia Post Evaluation    Patient: Lindy Singleton    Procedure(s) Performed: Procedure(s) (LRB):  DELIVERY- SECTION (N/A)    Final Anesthesia Type: spinal  Patient location during evaluation: PACU  Patient participation: Yes- Able to Participate  Level of consciousness: awake and alert, oriented and awake  Post-procedure vital signs: reviewed and stable  Pain management: adequate  Airway patency: patent  PONV status at discharge: No PONV  Anesthetic complications: no      Cardiovascular status: blood pressure returned to baseline  Respiratory status: unassisted and spontaneous ventilation  Hydration status: euvolemic  Follow-up not needed.        Visit Vitals  /66   Pulse 73   Temp 36.5 °C (97.7 °F) (Oral)   Resp 16   Ht 5' 5" (1.651 m)   Wt 121.6 kg (268 lb)   LMP 2016 (Exact Date)   SpO2 100%   Breastfeeding? No   BMI 44.60 kg/m²       Pain/Carlo Score: No Data Recorded      "

## 2017-08-23 NOTE — LACTATION NOTE
This note was copied from a baby's chart.     08/23/17 1604   Maternal Information   Date of Referral 08/23/17   Person Making Referral nurse   Maternal Reason for Referral breastfeeding currently   Maternal Infant Assessment   Breast Size Issue none   Breast Shape pendulous;round   Breast Density soft   Areola elastic   Nipple(s) everted   Infant Assessment   Tongue/Frenulum Symptoms appearance normal   Frenulum normal   Gum Symptoms pink   Sucking Reflex present   Rooting Reflex present   Swallow Reflex present   Skin Color pink   LATCH Score   Latch 2-->grasps breast, tongue down, lips flanged, rhythmic sucking   Audible Swallowing 2-->spontaneous and intermittent (24 hrs old)   Type Of Nipple 2-->everted (after stimulation)   Comfort (Breast/Nipple) 2-->soft/nontender   Hold (Positioning) 2-->no assist from staff, mother able to position/hold infant   Score (less than 7 for 2/more consecutive times, consult Lactation Consultant) 10       Number Scale   Presence of Pain denies   Maternal Infant Feeding   Maternal Emotional State independent;relaxed   Signs of Milk Transfer audible swallow   Presence of Pain yes   Comfort Measures Before/During Feeding maternal position adjusted   Time Spent (min) 0-15 min   Breastfeeding Education adequate infant intake;importance of skin-to-skin contact   Breastfeeding History   Currently Breastfeeding yes   Breastfeeding History yes   Previous Exclusive Breastfeeding yes   Previous Breastfeeding Success successful   Infant First Feeding   Infant First Feeding breastfeeding   Breastfeeding Start Date 08/23/17   Skin-to-Skin Contact Maintained   Breastfeeding Left Side (min) 18 Min   Feeding Infant   Feeding Readiness Cues eager;finger sucking;hand to mouth movements   Feeding Tolerance/Success arousal required   Effective Latch During Feeding yes   Audible Swallow yes   Suck/Swallow Coordination present   Lactation Referrals   Lactation Consult Breastfeeding assessment;Initial  assessment   Lactation Interventions   Maternal Breastfeeding Support encouragement offered;infant-mother separation minimized;lactation counseling provided   mother and infant transferred from L and D, minimal assist given to place infant to breast. Mother states that infant breast fed in L and D about 1140 and that she had breast fed other children.

## 2017-08-23 NOTE — PLAN OF CARE
Problem: Patient Care Overview  Goal: Plan of Care Review  Outcome: Ongoing (interventions implemented as appropriate)  POC reviewed with pt, pt verb understanding  [facility]  Breastfeeding

## 2017-08-23 NOTE — TREATMENT PLAN
Pt presents to unit for scheduled c section. POC discussed with pt, questions encouraged and answered. EFM placed x2, +fhts noted. Pt reports feeling occ ctx, denies vag bleeding or LOF. 18g IV started to L hand without difficulty, LR infusing. Call light given, instructed to call out for needs.

## 2017-08-23 NOTE — NURSING
Called anesthesia to notify of pt's increased pt with PCA. Dr. Cobb gave t/o for tylenol IV x 1 dose now.

## 2017-08-23 NOTE — NURSING
Report received from BRITTNEE Causey RN; care assumed. AAOx3, assessment completed. Left hand IV patent, infusing LR @ 500 cc/h without difficulty, no redness or swelling present. Denies pain, rates pain 0/10 using pain scale. POC reviewed, pt verb understanding. Call bell within reach, will monitor.

## 2017-08-23 NOTE — ANESTHESIA PROCEDURE NOTES
Spinal    Diagnosis: IUP  Patient location during procedure: OB  Start time: 8/23/2017 10:05 AM  Timeout: 8/23/2017 10:04 AM  End time: 8/23/2017 10:09 AM  Staffing  Anesthesiologist: PETAR FREDERICK  Performed: anesthesiologist   Preanesthetic Checklist  Completed: patient identified, surgical consent, pre-op evaluation, timeout performed, IV checked, risks and benefits discussed and monitors and equipment checked  Spinal Block  Patient position: sitting  Prep: ChloraPrep  Patient monitoring: heart rate, cardiac monitor and continuous pulse ox  Approach: midline  Location: L3-4  Injection technique: single shot  CSF Fluid: clear free-flowing CSF  Needle  Needle type: pencil-tip   Needle gauge: 25 G  Needle length: 3.5 in  Additional Documentation: incremental injection  Needle localization: anatomical landmarks  Assessment  Sensory level: T4   Dermatomal levels determined by pinch or prick  Ease of block: easy  Patient's tolerance of the procedure: comfortable throughout block and no complaints  Medications:  Bolus administered: 1.6 mL of 0.75 and with dextrose bupivacaine  Opioid administered: 15 mcg of   morphine and fentanyl

## 2017-08-24 LAB
BASOPHILS # BLD AUTO: 0.01 K/UL
BASOPHILS NFR BLD: 0.1 %
DIFFERENTIAL METHOD: ABNORMAL
EOSINOPHIL # BLD AUTO: 0.2 K/UL
EOSINOPHIL NFR BLD: 1.5 %
ERYTHROCYTE [DISTWIDTH] IN BLOOD BY AUTOMATED COUNT: 13.8 %
HCT VFR BLD AUTO: 27.2 %
HGB BLD-MCNC: 9.1 G/DL
LYMPHOCYTES # BLD AUTO: 2.1 K/UL
LYMPHOCYTES NFR BLD: 21.1 %
MCH RBC QN AUTO: 27.7 PG
MCHC RBC AUTO-ENTMCNC: 33.5 G/DL
MCV RBC AUTO: 83 FL
MONOCYTES # BLD AUTO: 0.7 K/UL
MONOCYTES NFR BLD: 7.3 %
NEUTROPHILS # BLD AUTO: 7 K/UL
NEUTROPHILS NFR BLD: 70 %
PLATELET # BLD AUTO: 201 K/UL
PMV BLD AUTO: 10.8 FL
RBC # BLD AUTO: 3.29 M/UL
WBC # BLD AUTO: 10.04 K/UL

## 2017-08-24 PROCEDURE — 25000003 PHARM REV CODE 250: Performed by: OBSTETRICS & GYNECOLOGY

## 2017-08-24 PROCEDURE — 90471 IMMUNIZATION ADMIN: CPT | Performed by: OBSTETRICS & GYNECOLOGY

## 2017-08-24 PROCEDURE — 11000001 HC ACUTE MED/SURG PRIVATE ROOM

## 2017-08-24 PROCEDURE — 90715 TDAP VACCINE 7 YRS/> IM: CPT | Performed by: OBSTETRICS & GYNECOLOGY

## 2017-08-24 PROCEDURE — 85025 COMPLETE CBC W/AUTO DIFF WBC: CPT

## 2017-08-24 PROCEDURE — 36415 COLL VENOUS BLD VENIPUNCTURE: CPT

## 2017-08-24 PROCEDURE — 63600175 PHARM REV CODE 636 W HCPCS: Performed by: OBSTETRICS & GYNECOLOGY

## 2017-08-24 RX ORDER — LABETALOL 100 MG/1
200 TABLET, FILM COATED ORAL EVERY 12 HOURS
Status: COMPLETED | OUTPATIENT
Start: 2017-08-24 | End: 2017-08-25

## 2017-08-24 RX ADMIN — LABETALOL HYDROCHLORIDE 100 MG: 100 TABLET, FILM COATED ORAL at 09:08

## 2017-08-24 RX ADMIN — CLOSTRIDIUM TETANI TOXOID ANTIGEN (FORMALDEHYDE INACTIVATED), CORYNEBACTERIUM DIPHTHERIAE TOXOID ANTIGEN (FORMALDEHYDE INACTIVATED), BORDETELLA PERTUSSIS TOXOID ANTIGEN (GLUTARALDEHYDE INACTIVATED), BORDETELLA PERTUSSIS FILAMENTOUS HEMAGGLUTININ ANTIGEN (FORMALDEHYDE INACTIVATED), BORDETELLA PERTUSSIS PERTACTIN ANTIGEN, AND BORDETELLA PERTUSSIS FIMBRIAE 2/3 ANTIGEN 0.5 ML: 5; 2; 2.5; 5; 3; 5 INJECTION, SUSPENSION INTRAMUSCULAR at 05:08

## 2017-08-24 RX ADMIN — SODIUM CHLORIDE, SODIUM LACTATE, POTASSIUM CHLORIDE, AND CALCIUM CHLORIDE: .6; .31; .03; .02 INJECTION, SOLUTION INTRAVENOUS at 01:08

## 2017-08-24 RX ADMIN — OXYCODONE HYDROCHLORIDE AND ACETAMINOPHEN 1 TABLET: 10; 325 TABLET ORAL at 08:08

## 2017-08-24 RX ADMIN — DOCUSATE SODIUM 200 MG: 100 CAPSULE, LIQUID FILLED ORAL at 08:08

## 2017-08-24 RX ADMIN — DOCUSATE SODIUM 200 MG: 100 CAPSULE, LIQUID FILLED ORAL at 09:08

## 2017-08-24 RX ADMIN — KETOROLAC TROMETHAMINE 30 MG: 30 INJECTION, SOLUTION INTRAMUSCULAR at 01:08

## 2017-08-24 RX ADMIN — KETOROLAC TROMETHAMINE 30 MG: 30 INJECTION, SOLUTION INTRAMUSCULAR at 11:08

## 2017-08-24 RX ADMIN — KETOROLAC TROMETHAMINE 30 MG: 30 INJECTION, SOLUTION INTRAMUSCULAR at 07:08

## 2017-08-24 RX ADMIN — OXYCODONE HYDROCHLORIDE AND ACETAMINOPHEN 1 TABLET: 10; 325 TABLET ORAL at 03:08

## 2017-08-24 RX ADMIN — DIPHENHYDRAMINE HYDROCHLORIDE 25 MG: 25 CAPSULE ORAL at 11:08

## 2017-08-24 RX ADMIN — OXYCODONE HYDROCHLORIDE AND ACETAMINOPHEN 1 TABLET: 10; 325 TABLET ORAL at 09:08

## 2017-08-24 NOTE — PLAN OF CARE
Problem: Patient Care Overview  Goal: Plan of Care Review  Outcome: Ongoing (interventions implemented as appropriate)  VSS. Fundas and lochia WNL. Low transverse abdominal incision edges approximated with steri strips intact; no redness, drainage, or swelling noted.  Ambulating. Voiding. Tolerating diet.  Passing flatus.  Pain meds PRN.

## 2017-08-24 NOTE — PROGRESS NOTES
Lindy Singleton is a 21 y.o. female patient.    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

## 2017-08-24 NOTE — LACTATION NOTE
This note was copied from a baby's chart.     08/24/17 0900   Maternal Infant Assessment   Breast Shape Bilateral:;pendulous   Breast Density Bilateral:;soft   Areola Bilateral:;elastic   Nipple(s) Bilateral:;everted   Infant Assessment   Sucking Reflex present   Rooting Reflex present   Swallow Reflex present   LATCH Score   Latch 2-->grasps breast, tongue down, lips flanged, rhythmic sucking   Audible Swallowing 2-->spontaneous and intermittent (24 hrs old)   Type Of Nipple 2-->everted (after stimulation)   Comfort (Breast/Nipple) 2-->soft/nontender   Hold (Positioning) 2-->no assist from staff, mother able to position/hold infant   Score (less than 7 for 2/more consecutive times, consult Lactation Consultant) 10   Maternal Infant Feeding   Maternal Emotional State independent;relaxed   Infant Positioning cradle   Signs of Milk Transfer audible swallow;infant jaw motion present   Presence of Pain yes   Time Spent (min) 0-15 min   Latch Assistance yes   Breastfeeding Education adequate infant intake;adequate milk volume;importance of skin-to-skin contact   Breastfeeding History   Currently Breastfeeding yes   Infant First Feeding   Breastfeeding breastfeeding, right side only   Breastfeeding Right Side (min) 3 Min  (continues to nurse)   Feeding Infant   Feeding Tolerance/Success arousal required   Effective Latch During Feeding yes   Audible Swallow yes   Suck/Swallow Coordination present   Lactation Referrals   Lactation Consult Breastfeeding assessment;Follow up;Knowledge deficit   Lactation Interventions   Attachment Promotion breastfeeding assistance provided;counseling provided;environment adjusted;face-to-face positioning promoted;infant-mother separation minimized;privacy provided;role responsibility promoted;rooming-in promoted;skin-to-skin contact encouraged   Breastfeeding Assistance assisted with positioning;feeding cue recognition promoted;feeding on demand promoted;infant latch-on verified;infant  suck/swallow verified;support offered   Maternal Breastfeeding Support diary/feeding log utilized;encouragement offered;infant-mother separation minimized;lactation counseling provided   Latch Promotion positioning assisted;infant moved to breast   Baby had not fed for 4 hours; Assisted mother with waking infant to feed; Infant latched easily to right breast with little assistance; Reinforced breastfeeding basics; Encouraged to feed on cue, at least 8 or more times in 24 hours; Mother denies pain or discomfort; Phone number provided; Encouraged to call for needs or assistance prn; Verbalized understanding

## 2017-08-24 NOTE — PHYSICIAN QUERY
PT Name: Lindy Singleton  MR #: [id number]     Physician Query Form - Documentation Clarification      CDS/: Natalie Castle RN-BSN        Contact information:[phone]    This form is a permanent document in the medical record.     Query Date: 2017    By submitting this query, we are merely seeking further clarification of documentation. Please utilize your independent clinical judgment when addressing the question(s) below.    The Medical record reflects the following:    Supporting Clinical Findings Location in Medical Record   Past Medical History:  Hypertension - prepregnancy Labetalol 100 mg BID - stopped at initial visit    Procedure:  Repeat Low Transverse  Section with 2 layer closure    Bp= 174/95, 177/87, 164/95, 170/91    labetalol tablet 100 mg    H&P         Op note       Flowsheet     MAR                                                                                       Doctor, Please specify diagnosis or diagnoses associated with above clinical findings.    Provider Use Only      [x] Pre-existing essential hypertension complicating childbirth  [  ] Past medical history only of hypertension  [  ] Other ( Please specify )____________________________________________                                                                                                                 [  ] Clinically undetermined

## 2017-08-24 NOTE — PLAN OF CARE
Problem: Patient Care Overview  Goal: Plan of Care Review  Outcome: Ongoing (interventions implemented as appropriate)  Reviewed plan of care with patient, patient verbalizes understanding

## 2017-08-25 VITALS
HEART RATE: 92 BPM | WEIGHT: 264 LBS | DIASTOLIC BLOOD PRESSURE: 67 MMHG | TEMPERATURE: 98 F | HEIGHT: 64 IN | OXYGEN SATURATION: 100 % | RESPIRATION RATE: 20 BRPM | BODY MASS INDEX: 45.07 KG/M2 | SYSTOLIC BLOOD PRESSURE: 142 MMHG

## 2017-08-25 PROCEDURE — 25000003 PHARM REV CODE 250: Performed by: OBSTETRICS & GYNECOLOGY

## 2017-08-25 RX ORDER — FERROUS SULFATE 325(65) MG
325 TABLET, DELAYED RELEASE (ENTERIC COATED) ORAL 2 TIMES DAILY
Qty: 60 TABLET | Refills: 3 | Status: SHIPPED | OUTPATIENT
Start: 2017-08-25 | End: 2018-01-11

## 2017-08-25 RX ORDER — IBUPROFEN 600 MG/1
600 TABLET ORAL EVERY 6 HOURS PRN
Qty: 40 TABLET | Refills: 1 | Status: SHIPPED | OUTPATIENT
Start: 2017-08-25 | End: 2018-01-11

## 2017-08-25 RX ORDER — OXYCODONE AND ACETAMINOPHEN 5; 325 MG/1; MG/1
1 TABLET ORAL EVERY 4 HOURS PRN
Qty: 28 TABLET | Refills: 0 | Status: SHIPPED | OUTPATIENT
Start: 2017-08-25 | End: 2018-01-11

## 2017-08-25 RX ADMIN — IBUPROFEN 600 MG: 600 TABLET, FILM COATED ORAL at 05:08

## 2017-08-25 RX ADMIN — IBUPROFEN 600 MG: 600 TABLET, FILM COATED ORAL at 12:08

## 2017-08-25 RX ADMIN — DOCUSATE SODIUM 200 MG: 100 CAPSULE, LIQUID FILLED ORAL at 08:08

## 2017-08-25 RX ADMIN — OXYCODONE HYDROCHLORIDE AND ACETAMINOPHEN 1 TABLET: 10; 325 TABLET ORAL at 08:08

## 2017-08-25 RX ADMIN — LABETALOL HYDROCHLORIDE 200 MG: 100 TABLET, FILM COATED ORAL at 08:08

## 2017-08-25 NOTE — PLAN OF CARE
Problem: Patient Care Overview  Goal: Plan of Care Review  Outcome: Outcome(s) achieved Date Met: 08/25/17  Patient in stable condition. Verbalizes understanding of care plan with good recall.

## 2017-08-25 NOTE — PLAN OF CARE
Problem: Postpartum ( Delivery) (Adult,Obstetrics,Pediatric)  Intervention: Prevent/Manage DVT/VTE Risk  Patient encouraged to ambulate more frequently in halls. Understanding verbalized.

## 2017-08-25 NOTE — LACTATION NOTE
Review breastfeeding discharge information with parents now -aware of need to monitor wet and dirty diapers over next few days -expresses understanding of all informaiton and has no questions -referred to breastfeeding guide for resources at home -plan follow-up phone call in next 48 hours

## 2017-08-25 NOTE — DISCHARGE SUMMARY
Delivery Discharge Summary  Obstetrics      Principle diagnosis:  Pregnancy     Conditions: h/o prev c/s    Hospital Course: Patient was admitted underwent a  section due to none.  Her post-operative was uneventful.    Delivery:    Episiotomy:     Lacerations:     Repair suture:     Repair # of packets:     Blood loss (ml): 0     Birth information:  YOB: 2018   Time of birth: 10:25 AM   Sex: female   Delivery type: , Low Transverse   Gestational Age: 39w3d    Delivery Clinician:      Other providers:       Additional  information:  Forceps:    Vacuum:    Breech:    Observed anomalies      Living?:           APGARS  One minute Five minutes Ten minutes   Skin color:         Heart rate:         Grimace:         Muscle tone:         Breathing:         Totals: 9  9        Placenta: Delivered:       appearance      Feeding Method: bottle    Rh Immune Globulin Given: no  Rubella Vaccine Given: no  Tdap Vaccine Given: yes    Discharge Date: 2017    Follow-up 1 week    Disposition:  Home    Patient Instructions:   Current Discharge Medication List      START taking these medications    Details   ferrous sulfate 325 (65 FE) MG EC tablet Take 1 tablet (325 mg total) by mouth 2 (two) times daily.  Qty: 60 tablet, Refills: 3      ibuprofen (ADVIL,MOTRIN) 600 MG tablet Take 1 tablet (600 mg total) by mouth every 6 (six) hours as needed.  Qty: 40 tablet, Refills: 1      oxycodone-acetaminophen (PERCOCET) 5-325 mg per tablet Take 1 tablet by mouth every 4 (four) hours as needed.  Qty: 28 tablet, Refills: 0         CONTINUE these medications which have NOT CHANGED    Details   PRENATAL VIT CALC,IRON,FOLIC (PRENATAL VITAMIN ORAL) Take by mouth.             No discharge procedures on file.     No heavy lifting.      Pelvic rest x 6 weeks.      Normal diet    Normal activity    Augustus Albert MD

## 2017-08-25 NOTE — PLAN OF CARE
Problem: Fall Risk (Adult)  Intervention: Review Medications/Identify Contributors to Fall Risk  Patient started on increased dosage of labetalol per doctor's orders. Instructed on risk. Instructed to sit in bed before standing and to call for assistance if needed. Patient verbalized understanding.

## 2017-08-25 NOTE — PROGRESS NOTES
Delivery Progress Note  Obstetrics        SUBJECTIVE:     Postpartum Day 2:  Delivery    Ms. Singleton states she feels well. She denies emotional concerns. Her pain is well controlled with current medications. The baby is well. The baby is feeding via breast. The patient is ambulating well. Ms. Singlteon is tolerating a normal diet. Flatus has been passed. Urinary output is adequate.    OBJECTIVE:     Vital Signs (Most Recent):  Temp: 97.1 °F (36.2 °C) (17 0800)  Pulse: 88 (17 0800)  Resp: 16 (17 0800)  BP: 136/72 (17 0800)  SpO2: 100 % (17 1359)    Vital Signs Range (Last 24H):  Temp:  [97.1 °F (36.2 °C)-98.9 °F (37.2 °C)]   Pulse:  [83-99]   Resp:  [16-20]   BP: (136-188)/(69-86)     I & O (Last 24H):  Intake/Output Summary (Last 24 hours) at 17 0928  Last data filed at 17 0600   Gross per 24 hour   Intake             2160 ml   Output                0 ml   Net             2160 ml     Physical Exam:  General:    no distress   Lungs:  clear to auscultation bilaterally   Heart:  regular rate and rhythm, S1, S2 normal, no murmur, click, rub or gallop   Breasts:  no discharge, erythema, or tenderness   Abdomen:  soft, non-tender; bowel sounds normal   Fundus:  firm   Incision:  healing well   Lochia:   scant rubra   DVT Evaluation:  No evidence of DVT on either side seen on physical exam.     Hemoglobin/Hematocrit    Recent Labs  Lab 17  0602   HGB 9.1*   HCT 27.2*     ABO/Rh  Lab Results   Component Value Date    GROUPTRH O POS 2017     Rubella  No results for input(s): RUBELLAIGGSC in the last 168 hours.    ASSESSMENT/PLAN:     Status post  section. Doing well postoperatively.     Continue current care.  D/c to home    Augustus Albert MD

## 2017-08-25 NOTE — LACTATION NOTE
This note was copied from a baby's chart.     08/25/17 0800   Coping/Psychosocial   Care Plan Reviewed With mother   Coping Interventions care explained to patient/family prior to performing;counseling provided   Maternal/Infant Attachment holds infant;participates in feeding   Coping/Psychosocial Interventions   Parent/Child Attachment Promotion attachment promoted;positive reinforcement provided;parent-child separation minimized;role responsibility promoted;rooming-in promoted   Nutrition   Feeding Readiness Cues rooting;eager   Feeding Method breastfeeding   Feeding Tolerance/Success rooting;strong suck;coordinated suck;coordinated swallow   Breastfeeding Session   Breastfeeding breastfeeding, left side only   Infant Positioning ventral;cradle   Breastfeeding Left Side (min) 23 Min   Effective Latch During Feeding yes   Suck/Swallow Coordination present   Signs of Milk Transfer audible swallow;infant jaw motion present   LATCH Score   Latch 2-->grasps breast, tongue down, lips flanged, rhythmic sucking   Audible Swallowing 2-->spontaneous and intermittent (24 hrs old)   Type Of Nipple 2-->everted (after stimulation)   Comfort (Breast/Nipple) 2-->soft/nontender   Hold (Positioning) 1-->minimal assist, teach one side: mother does other, staff holds   Score (less than 7 for 2/more consecutive times, consult Lactation Consultant) 9   Nutrition Interventions   Breastfeeding Assistance assisted with positioning;feeding cue recognition promoted;feeding on demand promoted;feeding session observed;infant latch-on verified;infant suck/swallow verified;support offered   Hypoglycemia Management (Infant) breastfeeding promoted   Latch Promotion positioning assisted;infant's mouth opened gently   Maternal Breastfeeding Support encouragement offered;infant-mother separation minimized;lactation counseling provided   Separation   Location of Baby Mother-Baby Room   Location of Mother Mother-Baby Room   mother breastfeeding  independently now -complaint of slight tenderness now -assist to pull down jaw and re-position for deeper comfortable latch -strong sucking and swallows noted -encouraged to call for any assistance today

## 2017-08-25 NOTE — DISCHARGE INSTRUCTIONS
After a Cesearean Birth    General Discharge Instructions  · May follow a regular diet, unless otherwise discussed with physician.  · Take showers, not baths unless otherwise discussed with physician.  · Activity as tolerated.  · No lifting or heavy exercise for 6 weeks, no driving for 2 weeks, no sexual intercourse, douching or tampons for 6 weeks  · May return to work/school as discussed with physician  · Discuss birth control with physician  · Breast care support bra worn at all times  · Lactation consultant referral number ( [phone] or [phone])    Call Your Healthcare Provider Right Away If You Have:  · A fever of 101°F or higher.  · Incisional drainage  · Heavy vaginal bleeding, clots, or vaginal discharge with foul odor.  · Redness, discharge, or pain worse than you had in the hospital.  · Burning, pain, red streaks, or lumpy areas in your breasts.  · Cracks, blisters, or blood on your nipples.  · Burning or pain when you urinate.  · Persistent nausea or vomiting.  · Severe headaches, blurred vision, dizziness or fainting.  · Feelings of extreme sadness or anxiety, or a feeling that you dont want to be with your baby.  · No bowel movement for 5 days.  · Redness, warmth, swelling, or pain in the lower leg.    Incision Care  · You will be able to shower and pat the incision dry.  · Watch your incision for signs of infection, such as increasing redness or drainage.  · For ease of movement, hold a pillow against the incision when you get up from a lying or sitting position, and when you laugh or cough.  · Avoid heavy lifting--nothing heavier than your baby until your doctor instructs you otherwise.   Follow-Up  Schedule a  follow-up exam with your healthcare provider for about 6 weeks after delivery. During this exam, your uterus and vaginal area will be checked. Contact your healthcare provider if you think you or your baby are having any problems.        Breastfeeding Discharge  Instructions     AAP recommends exclusive breastfeeding for the first 6 months of life and continued breastfeeding with the introduction of supplemental foods beyond the first year of life.  Recommends to delay all bottle and pacifier use until after 4 weeks of age and breastfeeding is well established.  Discussed the benefits of exclusive breastfeeding for both mother and baby.  Discussed the risks of supplementation/pacifier use on the exclusivity of breastfeeding in the first 6 months. Feed the baby at the earliest sign of hunger or comfort  o Hands to mouth, sucking motions  o Rooting or searching for something to suck on  o Dont wait for crying - it is a not a late sign of hunger; it is a sign of distress     The feedings may be 8-12 times per 24hrs and will not follow a schedule   Alternate the breast you start the feeding with, or start with the breast that feels the fullest   Switch breasts when the baby takes himself off the breast or falls asleep   Keep offering breasts until the baby looks full, no longer gives hunger signs, and stays asleep when placed on his back in the crib   If the baby is sleepy and wont wake for a feeding, put the baby skin-to-skin dressed in a diaper against the mothers bare chest   Sleep near your baby   The baby should be positioned and latched on to the breast correctly  o Chest-to-chest, chin in the breast  o Babys lips are flipped outward  o Babys mouth is stretched open wide like a shout  o Babys sucking should feel like tugging to the mother  - The baby should be drinking at the breast:  o You should hear swallowing or gulping throughout the feeding  o You should see milk on the babys lips when he comes off the breast  o Your breasts should be softer when the baby is finished feeding  o The baby should look relaxed at the end of feedings  o After the 4th day and your milk is in:  o The babys poop should turn bright yellow and be loose, watery, and  seedy  o The baby should have at least 3-4 poops the size of the palm of your hand per day  o The baby should have at least 6-8 wet diapers per day  o The urine should be light yellow in color  You should drink when you are thirsty and eat a healthy diet when you are    hungry.     Take naps to get the rest you need.   Take medications and/or drink alcohol only with permission of your obstetrician    or the babys pediatrician.  You can also call the [facility],   ([phone]), Monday-Friday, 8am-5pm Central time, to get the most   up-to-date evidence-based information on the use of medications during   pregnancy and breastfeeding.      The baby should be examined by a pediatrician at 3-5 days of age; unless ordered sooner by the pediatrician.  Once your milk comes in, the baby should be back to birth weight no later than 10-14 days of age.    For questions or concerns, Please contact Lactation Services at [phone]

## 2017-08-25 NOTE — PLAN OF CARE
Problem: Patient Care Overview  Goal: Individualization & Mutuality  Outcome: Ongoing (interventions implemented as appropriate)  VSS except for slightly elevated BP. Patient started on increased dosage of labetalol per doctor's orders this shift. Pain controlled with PRN PO pain medication. Patient ambulating in room only and encouraged to take longer walks in hallway. LTV incision cd&i. SL to LH patent and flushed. Regular diet tolerating well. Passing some flatus. Breastfeeding well independently with no difficulty latching infant to breast. POC discussed with patient and understanding verbalized and with good recall. SUSHILA.

## 2017-08-27 ENCOUNTER — TELEPHONE (OUTPATIENT)
Dept: OBSTETRICS AND GYNECOLOGY | Facility: HOSPITAL | Age: 22
End: 2017-08-27

## 2017-08-27 NOTE — TELEPHONE ENCOUNTER
Lactation Telephone Follow-up:  Answer and then hung up x 2 attempts; unable to leave a message with return phone number on voicemail.

## 2017-08-28 ENCOUNTER — TELEPHONE (OUTPATIENT)
Dept: OBSTETRICS AND GYNECOLOGY | Facility: HOSPITAL | Age: 22
End: 2017-08-28

## 2017-08-31 PROBLEM — O99.820 GBS (GROUP B STREPTOCOCCUS CARRIER), +RV CULTURE, CURRENTLY PREGNANT: Status: RESOLVED | Noted: 2017-07-31 | Resolved: 2017-08-31

## 2018-02-22 ENCOUNTER — OFFICE VISIT (OUTPATIENT)
Dept: OBSTETRICS AND GYNECOLOGY | Facility: CLINIC | Age: 23
End: 2018-02-22
Attending: OBSTETRICS & GYNECOLOGY
Payer: MEDICAID

## 2018-02-22 VITALS
WEIGHT: 255.5 LBS | HEIGHT: 64 IN | DIASTOLIC BLOOD PRESSURE: 76 MMHG | BODY MASS INDEX: 43.62 KG/M2 | SYSTOLIC BLOOD PRESSURE: 118 MMHG

## 2018-02-22 DIAGNOSIS — R10.2 PELVIC PAIN: Primary | ICD-10-CM

## 2018-02-22 DIAGNOSIS — N89.8 VAGINAL DISCHARGE: ICD-10-CM

## 2018-02-22 PROBLEM — Z34.03 ENCOUNTER FOR SUPERVISION OF NORMAL FIRST PREGNANCY IN THIRD TRIMESTER: Status: RESOLVED | Noted: 2017-08-23 | Resolved: 2018-02-22

## 2018-02-22 LAB
B-HCG UR QL: NEGATIVE
BILIRUB UR QL STRIP: NEGATIVE
C TRACH DNA SPEC QL NAA+PROBE: NOT DETECTED
CANDIDA RRNA VAG QL PROBE: NEGATIVE
CLARITY UR REFRACT.AUTO: CLEAR
COLOR UR AUTO: YELLOW
CTP QC/QA: YES
G VAGINALIS RRNA GENITAL QL PROBE: NEGATIVE
GLUCOSE UR QL STRIP: NEGATIVE
HGB UR QL STRIP: NEGATIVE
KETONES UR QL STRIP: NEGATIVE
LEUKOCYTE ESTERASE UR QL STRIP: NEGATIVE
N GONORRHOEA DNA SPEC QL NAA+PROBE: NOT DETECTED
NITRITE UR QL STRIP: NEGATIVE
PH UR STRIP: 6 [PH] (ref 5–8)
PROT UR QL STRIP: NEGATIVE
SP GR UR STRIP: 1.02 (ref 1–1.03)
T VAGINALIS RRNA GENITAL QL PROBE: NEGATIVE
URN SPEC COLLECT METH UR: NORMAL
UROBILINOGEN UR STRIP-ACNC: NEGATIVE EU/DL

## 2018-02-22 PROCEDURE — 87491 CHLMYD TRACH DNA AMP PROBE: CPT

## 2018-02-22 PROCEDURE — 99204 OFFICE O/P NEW MOD 45 MIN: CPT | Mod: S$PBB,,, | Performed by: OBSTETRICS & GYNECOLOGY

## 2018-02-22 PROCEDURE — 87086 URINE CULTURE/COLONY COUNT: CPT

## 2018-02-22 PROCEDURE — 99999 PR PBB SHADOW E&M-EST. PATIENT-LVL III: CPT | Mod: PBBFAC,,, | Performed by: OBSTETRICS & GYNECOLOGY

## 2018-02-22 PROCEDURE — 3008F BODY MASS INDEX DOCD: CPT | Mod: ,,, | Performed by: OBSTETRICS & GYNECOLOGY

## 2018-02-22 PROCEDURE — 99213 OFFICE O/P EST LOW 20 MIN: CPT | Mod: PBBFAC | Performed by: OBSTETRICS & GYNECOLOGY

## 2018-02-22 PROCEDURE — 87088 URINE BACTERIA CULTURE: CPT

## 2018-02-22 PROCEDURE — 81025 URINE PREGNANCY TEST: CPT | Mod: PBBFAC | Performed by: OBSTETRICS & GYNECOLOGY

## 2018-02-22 PROCEDURE — 87077 CULTURE AEROBIC IDENTIFY: CPT

## 2018-02-22 PROCEDURE — 87480 CANDIDA DNA DIR PROBE: CPT

## 2018-02-22 PROCEDURE — 81003 URINALYSIS AUTO W/O SCOPE: CPT

## 2018-02-22 PROCEDURE — 87186 SC STD MICRODIL/AGAR DIL: CPT

## 2018-02-22 RX ORDER — DOXYCYCLINE 100 MG/1
100 CAPSULE ORAL EVERY 12 HOURS
Qty: 14 CAPSULE | Refills: 0 | Status: SHIPPED | OUTPATIENT
Start: 2018-02-22 | End: 2018-03-01

## 2018-02-22 NOTE — PROGRESS NOTES
SUBJECTIVE:   22 y.o. female   for vaginal d/c. Patient's last menstrual period was 2018..  Started with d/c about 3 weeks ago.  Foul odor.  Used OTC Monistat with little relief.  Also started with pelvic pain 2 weeks ago.  No n/v/d.  No fever.  Had Paragard .  Has regular periods.  H/o ovarian cyst.  Op not from c/S  does not mention ovarian cyst.  Same sex partner.  Neg GC, CT cx .  Had colpo bx done .  Has not followed up on results.         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     Social History Main Topics    Smoking status: Never Smoker    Smokeless tobacco: Never Used    Alcohol use No    Drug use: No    Sexual activity: Yes     Partners: Male     Other Topics Concern    Not on file     Social History Narrative    No narrative on file     Family History   Problem Relation Age of Onset    Breast cancer Neg Hx     Colon cancer Neg Hx     Ovarian cancer Neg Hx      OB History    Para Term  AB Living   4 4 4     4   SAB TAB Ectopic Multiple Live Births         0 4      # Outcome Date GA Lbr Koko/2nd Weight Sex Delivery Anes PTL Lv   4 Term 17 39w3d  2.9 kg (6 lb 6.3 oz) F CS-LTranv Spinal  BAO   3 Term  37w0d  3.062 kg (6 lb 12 oz)  CS-LTranv   BAO      Birth Comments: Omphalocele   2 Term  40w0d  2.268 kg (5 lb) F Vag-Spont   BAO   1 Term  "2011 37w0d  3.459 kg (7 lb 10 oz)  Vag-Spont   BAO            Current Outpatient Prescriptions   Medication Sig Dispense Refill    doxycycline (MONODOX) 100 MG capsule Take 1 capsule (100 mg total) by mouth every 12 (twelve) hours. 14 capsule 0     No current facility-administered medications for this visit.      Allergies: Patient has no known allergies.     ROS:  Constitutional: no weight loss, weight gain, fever, fatigue  Eyes:  No vision changes, glasses/contacts  ENT/Mouth: No ulcers, sinus problems, ears ringing, headache  Cardiovascular: No inability to lie flat, chest pain, exercise intolerance, swelling, heart palpitations  Respiratory: No wheezing, coughing blood, shortness of breath, or cough  Gastrointestinal: No diarrhea, bloody stool, nausea/vomiting, constipation, gas, hemorrhoids  Genitourinary: No blood in urine, painful urination, urgency of urination, frequency of urination, incomplete emptying, incontinence, abnormal bleeding, painful periods, heavy periods, +vaginal discharge, +vaginal odor, painful intercourse, sexual problems, bleeding after intercourse.  Musculoskeletal: No muscle weakness  Skin/Breast: No painful breasts, nipple discharge, masses, rash, ulcers  Neurological: No passing out, seizures, numbness, headache  Endocrine: No diabetes, hypothyroid, hyperthyroid, hot flashes, hair loss, abnormal hair growth, ance  Psychiatric: No depression, crying  Hematologic: No bruises, bleeding, swollen lymph nodes, anemia.      OBJECTIVE:   The patient appears well, alert, oriented x 3, in no distress.  /76   Ht 5' 4" (1.626 m)   Wt 115.9 kg (255 lb 8.2 oz)   LMP 02/02/2018   BMI 43.86 kg/m²   ABDOMEN: no hernias, masses, or hepatosplenomegaly  GENITALIA: normal external genitalia, no erythema, no discharge  URETHRA: normal urethra, normal urethral meatus  VAGINA: Normal  CERVIX: no lesions.  IUD strings seen  UTERUS: normal size, contour, position, consistency, mobility.  Tender to " uterus, cervix, adnexa, and bladder.  ADNEXA: no mass, fullness, tenderness      ASSESSMENT:   1. Pelvic pain  POCT Urine Pregnancy    Vaginosis Screen by DNA Probe    C. trachomatis/N. gonorrhoeae by AMP DNA Cervix    Urinalysis    Urine culture    US Pelvis Comp with Transvag NON-OB (xpd   2. Vaginal discharge  Vaginosis Screen by DNA Probe    C. trachomatis/N. gonorrhoeae by AMP DNA Cervix       PLAN:   Orders Placed This Encounter    Vaginosis Screen by DNA Probe    C. trachomatis/N. gonorrhoeae by AMP DNA Cervix    Urine culture    US Pelvis Comp with Transvag NON-OB (xpd    Urinalysis    POCT Urine Pregnancy    doxycycline (MONODOX) 100 MG capsule     Discussed normal d/c.  IUD strings seen.  Pelvic exam exhibits tenderness.  Treat with doxy x 2 weeks.  Possible PID vs UTI.  U/S to check IUD placement.  Follow up for colpo results.  Return to clinic 2 weeks

## 2018-02-26 ENCOUNTER — TELEPHONE (OUTPATIENT)
Dept: OBSTETRICS AND GYNECOLOGY | Facility: CLINIC | Age: 23
End: 2018-02-26

## 2018-02-26 LAB — BACTERIA UR CULT: NORMAL

## 2018-02-26 RX ORDER — CIPROFLOXACIN 500 MG/1
500 TABLET ORAL 2 TIMES DAILY
Qty: 10 TABLET | Refills: 0 | Status: SHIPPED | OUTPATIENT
Start: 2018-02-26 | End: 2018-03-03

## 2018-02-28 ENCOUNTER — TELEPHONE (OUTPATIENT)
Dept: OBSTETRICS AND GYNECOLOGY | Facility: CLINIC | Age: 23
End: 2018-02-28

## 2018-02-28 NOTE — TELEPHONE ENCOUNTER
Receive a drug change request for Doxycycline 100 mg - Spoke with Sofía at [facility] to cancel prescription, provider did a medication change to [facility].Patient was notified also

## 2024-03-23 ENCOUNTER — OCCUPATIONAL HEALTH (OUTPATIENT)
Dept: URGENT CARE | Facility: CLINIC | Age: 29
End: 2024-03-23

## 2024-03-23 DIAGNOSIS — Z11.1 ENCOUNTER FOR PPD TEST: Primary | ICD-10-CM

## 2024-03-23 NOTE — PROGRESS NOTES
Subjective:      Patient ID: Lindy Singleton is a 28 y.o. female.    Chief Complaint: PPD Placement    Pt is here for TB skin placement.  ROS  Objective:     Physical Exam   Assessment:      No diagnosis found.  Plan:                 No follow-ups on file.

## 2024-03-28 ENCOUNTER — OCCUPATIONAL HEALTH (OUTPATIENT)
Dept: URGENT CARE | Facility: CLINIC | Age: 29
End: 2024-03-28

## 2024-03-28 DIAGNOSIS — Z13.9 ENCOUNTER FOR SCREENING: Primary | ICD-10-CM

## 2024-03-28 PROCEDURE — 86580 TB INTRADERMAL TEST: CPT | Mod: S$GLB,,, | Performed by: PHYSICIAN ASSISTANT

## 2024-03-30 LAB
TB INDURATION - 48 HR READ: 0 MM
TB INDURATION - 72 HR READ: 0 MM
TB SKIN TEST - 48 HR READ: NEGATIVE
TB SKIN TEST - 72 HR READ: NEGATIVE

## (undated) DEVICE — SPONGE LAP 18X18 PREWASHED

## (undated) DEVICE — PAD ABDOMINAL 5X9 STERILE

## (undated) DEVICE — SOL 9P NACL IRR PIC IL

## (undated) DEVICE — SLEEVE SCD EXPRESS CALF MEDIUM

## (undated) DEVICE — GLOVE SURG BIOGEL LATEX SZ 7.5

## (undated) DEVICE — CANISTER SUCTION 2 LTR

## (undated) DEVICE — GLOVE SURGICAL LATEX SZ 7

## (undated) DEVICE — SEE MEDLINE ITEM 152622